# Patient Record
Sex: FEMALE | URBAN - NONMETROPOLITAN AREA
[De-identification: names, ages, dates, MRNs, and addresses within clinical notes are randomized per-mention and may not be internally consistent; named-entity substitution may affect disease eponyms.]

---

## 2019-07-15 ENCOUNTER — OFFICE VISIT (OUTPATIENT)
Dept: URBAN - NONMETROPOLITAN AREA CLINIC 18 | Facility: CLINIC | Age: 49
End: 2019-07-15
Payer: COMMERCIAL

## 2019-07-15 DIAGNOSIS — H11.231 SYMBLEPHARON, RIGHT EYE: ICD-10-CM

## 2019-07-15 DIAGNOSIS — G43.B0 OPHTHALMOPLEGIC MIGRAINE, NOT INTRACTABLE: ICD-10-CM

## 2019-07-15 DIAGNOSIS — H04.123 DRY EYE SYNDROME OF BILATERAL LACRIMAL GLANDS: Primary | ICD-10-CM

## 2019-07-15 PROCEDURE — 99204 OFFICE O/P NEW MOD 45 MIN: CPT | Performed by: OPTOMETRIST

## 2019-07-15 ASSESSMENT — INTRAOCULAR PRESSURE
OD: 16
OS: 16

## 2019-07-15 NOTE — IMPRESSION/PLAN
Impression: Ophthalmoplegic migraine, not intractable: G43. B0. Plan: Reviewed concerns with patient in detail. No ocular or visual abnormalities found during examination attributed to visual disturbance origin. Will continue to monitor.

## 2019-07-15 NOTE — IMPRESSION/PLAN
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 Plan: A thorough review of the ocular findings was discussed. Unlikely the cause of her pain due to the fact it does not increase when she looks left. However discussed if pain increases, especially with eye movement, will refer for surgical eval. Patient understands condition and potential treatment options. All questions were answered and patient is aware of recommended follow up care.

## 2019-07-15 NOTE — IMPRESSION/PLAN
Impression: Dry eye syndrome of bilateral lacrimal glands: H04.123. Plan: Discussed chronic nature of condition in detail with patient and possibility that this may be the cause of some of her discomfort. Explained condition does not have a cure and will need artificial tears for maintenance. Recommended artificial tears QID OU for continuous maintenance of tear film.